# Patient Record
Sex: MALE | Race: WHITE | NOT HISPANIC OR LATINO | Employment: OTHER | URBAN - METROPOLITAN AREA
[De-identification: names, ages, dates, MRNs, and addresses within clinical notes are randomized per-mention and may not be internally consistent; named-entity substitution may affect disease eponyms.]

---

## 2019-01-01 ENCOUNTER — APPOINTMENT (OUTPATIENT)
Dept: GENERAL RADIOLOGY | Facility: HOSPITAL | Age: 84
End: 2019-01-01

## 2019-01-01 ENCOUNTER — HOSPITAL ENCOUNTER (EMERGENCY)
Facility: HOSPITAL | Age: 84
End: 2019-03-10
Attending: EMERGENCY MEDICINE | Admitting: EMERGENCY MEDICINE

## 2019-01-01 VITALS — HEIGHT: 72 IN | TEMPERATURE: 96 F | BODY MASS INDEX: 24.07 KG/M2 | OXYGEN SATURATION: 100 % | WEIGHT: 177.7 LBS

## 2019-01-01 DIAGNOSIS — E86.0 DEHYDRATION: ICD-10-CM

## 2019-01-01 DIAGNOSIS — E87.0 HYPERNATREMIA: ICD-10-CM

## 2019-01-01 DIAGNOSIS — I46.9 CARDIAC ARREST (HCC): Primary | ICD-10-CM

## 2019-01-01 LAB
ABO GROUP BLD: NORMAL
ALBUMIN SERPL-MCNC: 2.8 G/DL (ref 3.5–5.2)
ALBUMIN/GLOB SERPL: 1.2 G/DL
ALP SERPL-CCNC: 98 U/L (ref 39–117)
ALT SERPL W P-5'-P-CCNC: 1345 U/L (ref 1–41)
ANION GAP SERPL CALCULATED.3IONS-SCNC: 44.2 MMOL/L
ARTERIAL PATENCY WRIST A: ABNORMAL
AST SERPL-CCNC: 1413 U/L (ref 1–40)
ATMOSPHERIC PRESS: 753.4 MMHG
BASE EXCESS BLDA CALC-SCNC: -23.4 MMOL/L (ref 0–2)
BDY SITE: ABNORMAL
BILIRUB SERPL-MCNC: 0.3 MG/DL (ref 0.1–1.2)
BLD GP AB SCN SERPL QL: POSITIVE
BUN BLD-MCNC: 137 MG/DL (ref 8–23)
BUN/CREAT SERPL: 16.1 (ref 7–25)
CALCIUM SPEC-SCNC: 8.8 MG/DL (ref 8.6–10.5)
CHLORIDE SERPL-SCNC: 126 MMOL/L (ref 98–107)
CO2 SERPL-SCNC: 7.8 MMOL/L (ref 22–29)
CREAT BLD-MCNC: 8.5 MG/DL (ref 0.76–1.27)
DEPRECATED RDW RBC AUTO: 55 FL (ref 37–54)
ERYTHROCYTE [DISTWIDTH] IN BLOOD BY AUTOMATED COUNT: 13.5 % (ref 12.3–15.4)
GFR SERPL CREATININE-BSD FRML MDRD: 6 ML/MIN/1.73
GFR SERPL CREATININE-BSD FRML MDRD: ABNORMAL ML/MIN/1.73
GLOBULIN UR ELPH-MCNC: 2.4 GM/DL
GLUCOSE BLD-MCNC: 149 MG/DL (ref 65–99)
HCO3 BLDA-SCNC: 9.6 MMOL/L (ref 22–28)
HCT VFR BLD AUTO: 45.9 % (ref 37.5–51)
HGB BLD-MCNC: 12.8 G/DL (ref 13–17.7)
HOLD SPECIMEN: NORMAL
HOLD SPECIMEN: NORMAL
HOROWITZ INDEX BLD+IHG-RTO: 100 %
INR PPP: 1.84 (ref 0.9–1.1)
LYMPHOCYTES # BLD MANUAL: 2.8 10*3/MM3 (ref 0.7–3.1)
LYMPHOCYTES # BLD MANUAL: 2.8 10*3/MM3 (ref 0.7–3.1)
LYMPHOCYTES NFR BLD MANUAL: 17 % (ref 19.6–45.3)
LYMPHOCYTES NFR BLD MANUAL: 17 % (ref 19.6–45.3)
LYMPHOCYTES NFR BLD MANUAL: 3 % (ref 5–12)
LYMPHOCYTES NFR BLD MANUAL: 3 % (ref 5–12)
MACROCYTES BLD QL SMEAR: ABNORMAL
MAGNESIUM SERPL-MCNC: 3.2 MG/DL (ref 1.6–2.4)
MCH RBC QN AUTO: 30.3 PG (ref 26.6–33)
MCHC RBC AUTO-ENTMCNC: 27.9 G/DL (ref 31.5–35.7)
MCV RBC AUTO: 108.8 FL (ref 79–97)
METAMYELOCYTES NFR BLD MANUAL: 1 % (ref 0–0)
METAMYELOCYTES NFR BLD MANUAL: 1 % (ref 0–0)
MODALITY: ABNORMAL
MONOCYTES # BLD AUTO: 0.49 10*3/MM3 (ref 0.1–0.9)
MONOCYTES # BLD AUTO: 0.49 10*3/MM3 (ref 0.1–0.9)
NEUTROPHILS # BLD AUTO: 13.03 10*3/MM3 (ref 1.4–7)
NEUTROPHILS # BLD AUTO: 13.03 10*3/MM3 (ref 1.4–7)
NEUTROPHILS NFR BLD MANUAL: 79 % (ref 42.7–76)
NEUTROPHILS NFR BLD MANUAL: 79 % (ref 42.7–76)
NONSPECIFIC GEL REACTION: NORMAL
NRBC SPEC MANUAL: 1 /100 WBC (ref 0–0)
NRBC SPEC MANUAL: 1 /100 WBC (ref 0–0)
O2 A-A PPRESDIFF RESPIRATORY: 0.1 MMHG
PCO2 BLDA: 51.9 MM HG (ref 35–45)
PEEP RESPIRATORY: 5 CM[H2O]
PH BLDA: 6.88 PH UNITS (ref 7.35–7.45)
PLAT MORPH BLD: NORMAL
PLAT MORPH BLD: NORMAL
PLATELET # BLD AUTO: 165 10*3/MM3 (ref 140–450)
PMV BLD AUTO: 13 FL (ref 6–12)
PO2 BLDA: 81.4 MM HG (ref 80–100)
POTASSIUM BLD-SCNC: 7.4 MMOL/L (ref 3.5–5.2)
PROT SERPL-MCNC: 5.2 G/DL (ref 6–8.5)
PROTHROMBIN TIME: 20.9 SECONDS (ref 11.7–14.2)
RBC # BLD AUTO: 4.22 10*6/MM3 (ref 4.14–5.8)
RBC MORPH BLD: ABNORMAL
RH BLD: POSITIVE
SAO2 % BLDCOA: 83.2 % (ref 92–99)
SET MECH RESP RATE: 16
SODIUM BLD-SCNC: 178 MMOL/L (ref 136–145)
T&S EXPIRATION DATE: NORMAL
TOTAL RATE: 16 BREATHS/MINUTE
TROPONIN T SERPL-MCNC: 0.1 NG/ML (ref 0–0.03)
VENTILATOR MODE: ABNORMAL
VT ON VENT VENT: 0.55 ML
WBC MORPH BLD: NORMAL
WBC MORPH BLD: NORMAL
WBC NRBC COR # BLD: 16.49 10*3/MM3 (ref 3.4–10.8)
WHOLE BLOOD HOLD SPECIMEN: NORMAL
WHOLE BLOOD HOLD SPECIMEN: NORMAL

## 2019-01-01 PROCEDURE — 94002 VENT MGMT INPAT INIT DAY: CPT

## 2019-01-01 PROCEDURE — 80053 COMPREHEN METABOLIC PANEL: CPT | Performed by: EMERGENCY MEDICINE

## 2019-01-01 PROCEDURE — 86870 RBC ANTIBODY IDENTIFICATION: CPT | Performed by: EMERGENCY MEDICINE

## 2019-01-01 PROCEDURE — 92950 HEART/LUNG RESUSCITATION CPR: CPT

## 2019-01-01 PROCEDURE — 94799 UNLISTED PULMONARY SVC/PX: CPT

## 2019-01-01 PROCEDURE — 25010000002 EPINEPHRINE PF 1 MG/10ML SOLUTION PREFILLED SYRINGE

## 2019-01-01 PROCEDURE — 99285 EMERGENCY DEPT VISIT HI MDM: CPT

## 2019-01-01 PROCEDURE — 85007 BL SMEAR W/DIFF WBC COUNT: CPT | Performed by: EMERGENCY MEDICINE

## 2019-01-01 PROCEDURE — 85610 PROTHROMBIN TIME: CPT | Performed by: EMERGENCY MEDICINE

## 2019-01-01 PROCEDURE — 86900 BLOOD TYPING SEROLOGIC ABO: CPT | Performed by: EMERGENCY MEDICINE

## 2019-01-01 PROCEDURE — 25010000002 EPINEPHRINE PF 1 MG/10ML SOLUTION PREFILLED SYRINGE: Performed by: EMERGENCY MEDICINE

## 2019-01-01 PROCEDURE — 86850 RBC ANTIBODY SCREEN: CPT

## 2019-01-01 PROCEDURE — 36600 WITHDRAWAL OF ARTERIAL BLOOD: CPT

## 2019-01-01 PROCEDURE — 82803 BLOOD GASES ANY COMBINATION: CPT

## 2019-01-01 PROCEDURE — 85025 COMPLETE CBC W/AUTO DIFF WBC: CPT | Performed by: EMERGENCY MEDICINE

## 2019-01-01 PROCEDURE — 86901 BLOOD TYPING SEROLOGIC RH(D): CPT | Performed by: EMERGENCY MEDICINE

## 2019-01-01 PROCEDURE — 84484 ASSAY OF TROPONIN QUANT: CPT | Performed by: EMERGENCY MEDICINE

## 2019-01-01 PROCEDURE — 94770: CPT

## 2019-01-01 PROCEDURE — 86850 RBC ANTIBODY SCREEN: CPT | Performed by: EMERGENCY MEDICINE

## 2019-01-01 PROCEDURE — 83735 ASSAY OF MAGNESIUM: CPT | Performed by: EMERGENCY MEDICINE

## 2019-01-01 RX ADMIN — EPINEPHRINE 1 MG: 0.1 INJECTION, SOLUTION ENDOTRACHEAL; INTRACARDIAC; INTRAVENOUS at 10:40

## 2019-01-01 RX ADMIN — SODIUM BICARBONATE 50 MEQ: 84 INJECTION, SOLUTION INTRAVENOUS at 10:43

## 2019-01-01 RX ADMIN — EPINEPHRINE 1 MG: 0.1 INJECTION, SOLUTION ENDOTRACHEAL; INTRACARDIAC; INTRAVENOUS at 10:44

## 2019-03-10 NOTE — ED NOTES
"Arrived via AM EMS with witnessed arrest at NH ([facility]) at approx 0940. PT found in asystole by EMS. ETT placed with ETCO2 of 14. IO to L tibia. Epi x 4 and Defib x 1 for V-fib. Last Epi admin at approx 1020. Per EMS patient has been exhibiting \"signs of failure to thrive since arriving at facility\".      Kerry Schrader, RN  03/10/19 1029    "

## 2019-03-10 NOTE — ED PROVIDER NOTES
CODE DOCUMENTATION    HPI:  Pt is a 86 y.o. male from the NH, who presents as a full code in cardiac arrest at 0940, found to be in A systole. Chest compressions were performed at the NH, continued by EMS. Pt given Epi 4x, and intubated at 0954 by EMS. Per EMS pt's blood sugar was 178. Pt had return of pulse PTA in ED.    PEx: Pt is unresponsive, no pupil reactions, no gag reflex. No spontaneous resp, lungs are CTAB. Abd non tender, questionable abd mass. GSC 3T.     PROCEDURES:  Procedures  EKG          EKG time: 1030  Rhythm/Rate: junctional 81  P waves and DC: irregular P waves  QRS, axis: LAD, RBBB   ST and T waves: non specific changes     Interpreted Contemporaneously by me, independently viewed  No prior for comparison    PROGRESS NOTES:    1030- Pt has pulse at rate in the 90's. Pt's daughter is on the way.    1039- Pt coding again, chest compressions started, pt in PEA.     1041- Compressions paused, pt still in PEA. Compressions continued.    1043- 1 amp of Bicarb pushed. 1 epi pushed.    1044- Pt's pulse returned.    1050- Talked to pt's daughter for 5 minutes. Daughter informed us pt is DNR and do not want aggressive treatment, just palliative measures.    1115- Pt has no pulse.    1121- Pt's family in room.    1128- Pt .    DISPOSITION:      Documentation assistance provided by klaus Reyez for Dr. Barlow.  Information recorded by the scribe was done at my direction and has been verified and validated by me.       Debby Reyez  03/10/19 1131       Camden Barlow MD  03/10/19 2961

## 2019-03-10 NOTE — ED PROVIDER NOTES
EMERGENCY DEPARTMENT ENCOUNTER    CHIEF COMPLAINT  Chief Complaint: Cardiac Arrest  History given by: NH, Paramedics  History limited by: Acuity of condition  Room Number: 16/16  PMD: Provider, No Known      HPI:  Pt is a 86 y.o. male who presents with full arrest.  Pt is a 86 y.o. male from the NH, who presents as a full code in cardiac arrest at 0940, found to be in A systole. Chest compressions were performed at the NH, continued by EMS. Pt given Epi 4x, and intubated at 0954 by EMS. Per EMS pt's blood sugar was 178. Pt had return of pulse PTA in ED.    Location of Arrest - halfway  Witnessed Arrest - Yes  Bystander CPR - Yes  Time of Collapse - 0940  Time CPR Initiated - 0945  [facility] on Scene - EMS   Time on Scene - 0950  AED Used - Yes  Dispatch Time - 0945  EMS on Scene Time - 0950   EMS Agency - [facility]  Initial Cardiac Rhythm - Asystole  ROSC in Field - Yes, with pulse on arrival to ED  Time of Arrival to [facility] ED - 1026  STEMI - No  Treatment Prior to Arrival - Epix4, CPR, Intubation, Shockx1      PAST MEDICAL HISTORY  Active Ambulatory Problems     Diagnosis Date Noted   • No Active Ambulatory Problems     Resolved Ambulatory Problems     Diagnosis Date Noted   • No Resolved Ambulatory Problems     Past Medical History:   Diagnosis Date   • Arthritis    • Asthma    • Diabetes mellitus (CMS/HCC)    • Hyperlipidemia    • Stroke (CMS/HCC)        PAST SURGICAL HISTORY  History reviewed. No pertinent surgical history.    FAMILY HISTORY  History reviewed. No pertinent family history.    SOCIAL HISTORY  Social History     Socioeconomic History   • Marital status: Unknown     Spouse name: Not on file   • Number of children: Not on file   • Years of education: Not on file   • Highest education level: Not on file   Social Needs   • Financial resource strain: Not on file   • Food insecurity - worry: Not on file   • Food insecurity - inability: Not on file   • Transportation needs - medical:  Not on file   • Transportation needs - non-medical: Not on file   Occupational History   • Not on file   Tobacco Use   • Smoking status: Not on file   Substance and Sexual Activity   • Alcohol use: Not on file   • Drug use: Not on file   • Sexual activity: Not on file   Other Topics Concern   • Not on file   Social History Narrative   • Not on file       ALLERGIES  Patient has no known allergies.    REVIEW OF SYSTEMS  Review of Systems   Unable to perform ROS: Patient unresponsive   Endocrine: Positive for polyphagia.       PHYSICAL EXAM  ED Triage Vitals   Temp Heart Rate Resp BP SpO2   03/10/19 1100 03/10/19 1030 03/10/19 1030 03/10/19 1030 03/10/19 1045   96 °F (35.6 °C) 80 16 124/93 100 %      Temp src Heart Rate Source Patient Position BP Location FiO2 (%)   -- -- 03/10/19 1030 03/10/19 1030 03/10/19 1020     Lying Right arm 100 %       Physical Exam   Constitutional:   Unresponsive, GCS 3T   HENT:   Head: Normocephalic and atraumatic.   Mouth/Throat: Oropharynx is clear and moist.   Pupils non reactive   Eyes: Conjunctivae are normal.   Cardiovascular:   Irregular   Pulmonary/Chest:   Intubated - No spontaneous respirations   Abdominal: There is no tenderness.   Musculoskeletal: He exhibits no edema or tenderness.   Neurological:   Unresponsive - GCS 3T   Skin: No rash noted.   Nursing note and vitals reviewed.      LAB RESULTS  Lab Results (last 24 hours)     Procedure Component Value Units Date/Time    Comprehensive Metabolic Panel [[id number]]  (Abnormal) Collected:  03/10/19 1050    Specimen:  Blood Updated:  03/10/19 1136     Glucose 149 mg/dL       mg/dL      Creatinine 8.50 mg/dL      Sodium 178 mmol/L      Potassium 7.4 mmol/L      Chloride 126 mmol/L      CO2 7.8 mmol/L      Calcium 8.8 mg/dL      Total Protein 5.2 g/dL      Albumin 2.80 g/dL      ALT (SGPT) 1,345 U/L      AST (SGOT) 1,413 U/L      Alkaline Phosphatase 98 U/L      Total Bilirubin 0.3 mg/dL      eGFR Non  Amer 6  mL/min/1.73      Comment: <15 Indicative of kidney failure.        eGFR   Amer -- mL/min/1.73      Comment: <15 Indicative of kidney failure.        Globulin 2.4 gm/dL      A/G Ratio 1.2 g/dL      BUN/Creatinine Ratio 16.1     Anion Gap 44.2 mmol/L     Narrative:       The MDRD GFR formula is only valid for adults with stable renal function between ages 18 and 70.    CBC & Differential [[id number]] Collected:  03/10/19 1050    Specimen:  Blood Updated:  03/10/19 1102    Narrative:       The following orders were created for panel order CBC & Differential.  Procedure                               Abnormality         Status                     ---------                               -----------         ------                     Manual Differential[[id number]]                              In process                 CBC Auto Differential[[id number]]                            In process                   Please view results for these tests on the individual orders.    Protime-INR [[id number]]  (Abnormal) Collected:  03/10/19 1050    Specimen:  Blood Updated:  03/10/19 1110     Protime 20.9 Seconds      INR 1.84    Troponin [[id number]]  (Abnormal) Collected:  03/10/19 1050    Specimen:  Blood Updated:  03/10/19 1128     Troponin T 0.102 ng/mL     Narrative:       Troponin T Reference Range:  <= 0.03 ng/mL-   Negative for AMI  >0.03 ng/mL-     Abnormal for myocardial necrosis.  Clinicians would have to utilize clinical acumen, EKG, Troponin and serial changes to determine if it is an Acute Myocardial Infarction or myocardial injury due to an underlying chronic condition.     Magnesium [[id number]]  (Abnormal) Collected:  03/10/19 1050    Specimen:  Blood Updated:  03/10/19 1123     Magnesium 3.2 mg/dL     CBC Auto Differential [[id number]] Collected:  03/10/19 1050    Specimen:  Blood Updated:  03/10/19 1056    Manual Differential [[id number]] Collected:  03/10/19 1050    Specimen:  Blood Updated:  03/10/19 1102     Manual Differential [[id number]] Collected:  03/10/19 1050    Specimen:  Blood Updated:  03/10/19 1133    Blood Gas, Arterial [[id number]]  (Abnormal) Collected:  03/10/19 1051    Specimen:  Arterial Blood Updated:  03/10/19 1058     Site Arterial: right brachial     Alden's Test N/A     pH, Arterial 6.876 pH units      Comment: Critical:Notify RN Pretty Gibbons (10-Mar-19 10:55:38)Read back ok        pCO2, Arterial 51.9 mm Hg      pO2, Arterial 81.4 mm Hg      HCO3, Arterial 9.6 mmol/L      Base Excess, Arterial -23.4 mmol/L      O2 Saturation Calculated 83.2 %      A-a Gradiant 0.1 mmHg      Barometric Pressure for Blood Gas 753.4 mmHg      Modality Adult Vent     FIO2 100 %      Ventilator Mode VC     Set Tidal Volume 0.550     Set Mech Resp Rate 16     Rate 16 Breaths/minute      PEEP 5              PROCEDURES  CPR  Date/Time: 3/10/2019 11:44 AM  Performed by: Camden Barlow MD  Authorized by: Camden Barlow MD     Comments:      CPR Procedure    Upon my arrival Intubated, No Pulse.  Rhythm on arrival:asystole  Interventions:ACLS protocol followed: see resuscitation flowsheet.  Outcome: The resuscitation was unsuccessful and the patient was pronounced at 1128.              Critical Care  Performed by: Camden Barlow MD  Authorized by: Camden Barlow MD     Critical care provider statement:     Critical care time (minutes):  35    Critical care time was exclusive of:  Separately billable procedures and treating other patients (Excluding time doing CPR)    Critical care was necessary to treat or prevent imminent or life-threatening deterioration of the following conditions:  Circulatory failure and dehydration    Critical care was time spent personally by me on the following activities:  Development of treatment plan with patient or surrogate, ordering and review of laboratory studies, ordering and performing treatments and interventions, re-evaluation of patient's condition, pulse oximetry, examination of  patient, evaluation of patient's response to treatment, obtaining history from patient or surrogate and ventilator management        EKG                                 EKG time: 1030  Rhythm/Rate: junctional 81  P waves and IN: irregular P waves  QRS, axis: LAD, RBBB   ST and T waves: non specific changes      Interpreted Contemporaneously by me, independently viewed  No prior for comparison    PROGRESS AND CONSULTS     1030- Pt has pulse at rate in the 90's. Pt's daughter is on the way.     1039- Pt coding again, chest compressions started, pt in PEA.      1041- Compressions paused, pt still in PEA. Compressions continued.     1043- 1 amp of Bicarb pushed. 1 epi pushed.     1044- Pt's pulse returned.     1050- Talked to pt's daughter for 5 minutes. Daughter informed us pt is DNR and do not want aggressive treatment, just palliative measures.     1115- Pt has no pulse.     1121- Pt's family in room.     1128- Pt .    1143- Completed charting discussed with family.      MEDICAL DECISION MAKING  Results were reviewed/discussed with the patient and they were also made aware of online access. Pt also made aware that some labs, such as cultures, will not be resulted during ER visit and follow up with PMD is necessary.     MDM       DIAGNOSIS  Final diagnoses:   Cardiac arrest (CMS/HCC)   Hypernatremia   Dehydration       DISPOSITION      Latest Documented Vital Signs:  As of 11:43 AM  BP- (!) 0/0 HR- (!) 0 Temp- 96 °F (35.6 °C) O2 sat- 100%    --  Documentation assistance provided by klaus Evangelista for Camden Barlow.  Information recorded by the scribe was done at my direction and has been verified and validated by me.       Camden Barlow MD  03/10/19 1144